# Patient Record
Sex: FEMALE | Race: WHITE | NOT HISPANIC OR LATINO | Employment: FULL TIME | URBAN - METROPOLITAN AREA
[De-identification: names, ages, dates, MRNs, and addresses within clinical notes are randomized per-mention and may not be internally consistent; named-entity substitution may affect disease eponyms.]

---

## 2023-10-20 PROBLEM — F90.0 ATTENTION DEFICIT HYPERACTIVITY DISORDER (ADHD), PREDOMINANTLY INATTENTIVE TYPE: Status: ACTIVE | Noted: 2023-10-20

## 2023-10-20 PROBLEM — F41.9 ANXIETY: Status: ACTIVE | Noted: 2023-10-20

## 2023-10-20 RX ORDER — BETAMETHASONE DIPROPIONATE 0.5 MG/G
CREAM TOPICAL
COMMUNITY
Start: 2022-03-03

## 2023-10-20 RX ORDER — NORGESTIMATE AND ETHINYL ESTRADIOL 0.25-0.035
KIT ORAL
COMMUNITY
Start: 2022-08-04

## 2023-10-20 RX ORDER — DEXTROAMPHETAMINE SACCHARATE, AMPHETAMINE ASPARTATE MONOHYDRATE, DEXTROAMPHETAMINE SULFATE AND AMPHETAMINE SULFATE 2.5; 2.5; 2.5; 2.5 MG/1; MG/1; MG/1; MG/1
1 CAPSULE, EXTENDED RELEASE ORAL DAILY
COMMUNITY
Start: 2022-08-24

## 2023-10-20 RX ORDER — DESOGESTREL AND ETHINYL ESTRADIOL 0.15-0.03
1 KIT ORAL DAILY
COMMUNITY
Start: 2023-06-14 | End: 2024-02-02

## 2023-10-24 ENCOUNTER — OFFICE VISIT (OUTPATIENT)
Dept: DERMATOLOGY | Facility: CLINIC | Age: 23
End: 2023-10-24
Payer: COMMERCIAL

## 2023-10-24 DIAGNOSIS — D22.9 NEVUS: Primary | ICD-10-CM

## 2023-10-24 DIAGNOSIS — L91.0 HYPERTROPHIC SCAR: ICD-10-CM

## 2023-10-24 PROCEDURE — 99213 OFFICE O/P EST LOW 20 MIN: CPT | Performed by: NURSE PRACTITIONER

## 2023-10-24 NOTE — PROGRESS NOTES
Subjective     Simran Michelle is a 23 y.o. female who presents for the following: Skin Check (Denies personal h/o Skin Cancer, but Grandmother and Father have had skin cancer. There are a few moles on scalp and on chest that she is concerned with today.).     Review of Systems:  No other skin or systemic complaints other than what is documented elsewhere in the note.    The following portions of the chart were reviewed this encounter and updated as appropriate:          Skin Cancer History  No skin cancer on file.      Specialty Problems    None       Objective   Well appearing patient in no apparent distress; mood and affect are within normal limits.    A focused skin examination was performed. All findings within normal limits unless otherwise noted below.    Assessment/Plan   1. Nevus (2)  Mid Parietal Scalp (2)  Flesh-colored, verrucous papules    No evidence of recurrence in scar and benign ROS.   Continue with regular total body skin exams.  ABCDEs of melanoma and atypical moles were discussed with the patient.  Patient was instructed to perform monthly self skin examination.  We recommended that the patient have regular full skin exams given an increased risk of subsequent skin cancers.  The patient was instructed to use sun protective behaviors including use of broad spectrum sunscreens and sun protective clothing to reduce risk of skin cancers.     2. Hypertrophic scar  Chest - Medial (Center)  Pink, smooth papule    Scars and keloids  - While healing, your skin may develop scar tissue at the site of surgery, disease, burn, injury, or other trauma.   - The healing of injury sites normally produces a scar. Some individuals are more inclined to develop raised, reddish scars, or oversized, irregular, jagged scars.  - Keloids are more likely to occur in individuals with darker skin types. A keloid is an enlarged, smooth area of scar tissue that is larger than the original injury.  Wound care to reduce scarring  includes:  - After an injury, clean the wound, then apply ointment and a bandage  - Keep cuts and scrapes clean daily  - Change bandages daily  - Return to the doctor on time for removal of any stitches  - Use sunscreen on scars  - No treatment necessary. Please call the office if the lesion changes or becomes painful.

## 2023-10-24 NOTE — PROGRESS NOTES
Subjective     Simran Michelle is a 23 y.o. female who presents for the following: Skin Check (Denies personal h/o Skin Cancer, but Grandmother and Father have had skin cancer. There are a few moles on scalp and on chest that she is concerned with today.).     Review of Systems:  No other skin or systemic complaints other than what is documented elsewhere in the note.    The following portions of the chart were reviewed this encounter and updated as appropriate:          Skin Cancer History  No skin cancer on file.      Specialty Problems    None       Objective   Well appearing patient in no apparent distress; mood and affect are within normal limits.    A focused skin examination was performed. All findings within normal limits unless otherwise noted below.    Assessment/Plan

## 2023-12-29 ENCOUNTER — TELEMEDICINE (OUTPATIENT)
Dept: PRIMARY CARE | Facility: CLINIC | Age: 23
End: 2023-12-29
Payer: COMMERCIAL

## 2023-12-29 DIAGNOSIS — H93.8X9 EAR CONGESTION, UNSPECIFIED LATERALITY: ICD-10-CM

## 2023-12-29 DIAGNOSIS — R09.81 SINUS CONGESTION: ICD-10-CM

## 2023-12-29 DIAGNOSIS — U07.1 COVID-19: Primary | ICD-10-CM

## 2023-12-29 PROCEDURE — 99214 OFFICE O/P EST MOD 30 MIN: CPT | Performed by: STUDENT IN AN ORGANIZED HEALTH CARE EDUCATION/TRAINING PROGRAM

## 2023-12-29 RX ORDER — FLUTICASONE PROPIONATE 50 MCG
1 SPRAY, SUSPENSION (ML) NASAL 2 TIMES DAILY
Qty: 16 G | Refills: 1 | Status: SHIPPED | OUTPATIENT
Start: 2023-12-29

## 2023-12-29 RX ORDER — AZELASTINE 1 MG/ML
1 SPRAY, METERED NASAL 2 TIMES DAILY
Qty: 30 ML | Refills: 2 | Status: SHIPPED | OUTPATIENT
Start: 2023-12-29

## 2023-12-29 ASSESSMENT — ENCOUNTER SYMPTOMS
SHORTNESS OF BREATH: 0
COUGH: 1
SWEATS: 0
HEADACHES: 0
RHINORRHEA: 1
FEVER: 0
MYALGIAS: 1
WHEEZING: 0
WEIGHT LOSS: 0
HEMOPTYSIS: 0
SORE THROAT: 1
CHILLS: 0
HEARTBURN: 0

## 2023-12-29 NOTE — PROGRESS NOTES
Subjective   Patient ID: Simran Michelle is a 23 y.o. female who presents for Covid-19 Home Monitoring Video Visit.  Today she is on a telemedicine visit.    Cough  This is a new problem. The current episode started in the past 7 days. The problem has been gradually worsening. The problem occurs every few minutes. The cough is Non-productive. Associated symptoms include ear congestion, ear pain, myalgias, nasal congestion, postnasal drip, rhinorrhea and a sore throat. Pertinent negatives include no chest pain, chills, fever, headaches, heartburn, hemoptysis, rash, shortness of breath, sweats, weight loss or wheezing. Nothing aggravates the symptoms. Risk factors for lung disease include animal exposure.     COVID  Patient calling into the office today via a telemedicine virtual visit to discuss signs/symptoms of an upper respiratory infection as well as her positivity to COVID-19    Stated that her  tested positive for COVID-19, 5 days ago and ultimately she started having symptoms on Tuesday of this week, 3 days ago    Unfortunately she tested positive today for COVID-19    Continues to notice some sinus congestion, sore throat, ear pain, and the overall feeling of being slightly ill    Denies any fevers, chills, body aches, or any difficulty breathing    Currently works at [employer] and needs a work note and questioning when she should go back and when she will not be contagious        Current Outpatient Medications on File Prior to Visit   Medication Sig Dispense Refill    amphetamine-dextroamphetamine XR (Adderall XR) 10 mg 24 hr capsule Take 1 capsule (10 mg) by mouth once daily.      betamethasone dipropionate 0.05 % cream 1 Application      desogestreL-ethinyl estradioL (Apri) 0.15-0.03 mg tablet Take 1 tablet by mouth once daily.      norgestimate-ethinyl estradioL (Sprintec, 28,) 0.25-35 mg-mcg tablet Sprintec 28 0.25-35 MG-MCG Oral Tablet   Refills: 0        Start : 4-Aug-2022   Active  28 Tablet Pack        No current facility-administered medications on file prior to visit.        No Known Allergies    Immunization History   Administered Date(s) Administered    DTaP vaccine, pediatric  (INFANRIX) 2002, 2002, 2002, 08/11/2003, 11/10/2007    Hepatitis B vaccine, pediatric/adolescent (RECOMBIVAX, ENGERIX) 2002, 2002, 2002    HiB PRP-T conjugate vaccine (HIBERIX, ACTHIB) 2002, 2002, 2002, 05/08/2003    MMR vaccine, subcutaneous (MMR II) 02/18/2003, 11/10/2007    Pneumococcal Conjugate PCV 7 2002, 2002, 2002, 08/11/2003    Poliovirus vaccine, subcutaneous (IPOL) 2002, 2002, 2002, 11/10/2007    Varicella vaccine, subcutaneous (VARIVAX) 02/18/2003, 01/05/2011         Review of Systems   Constitutional:  Negative for chills, fever and weight loss.   HENT:  Positive for ear pain, postnasal drip, rhinorrhea and sore throat.    Respiratory:  Positive for cough. Negative for hemoptysis, shortness of breath and wheezing.    Cardiovascular:  Negative for chest pain.   Gastrointestinal:  Negative for heartburn.   Musculoskeletal:  Positive for myalgias.   Skin:  Negative for rash.   Neurological:  Negative for headaches.     All pertinent positive symptoms are included in the history of present illness.  All other systems have been reviewed and are negative and noncontributory to this patient's current ailments.     Objective   There were no vitals taken for this visit.  BSA: There is no height or weight on file to calculate BSA.  No visits with results within 1 Month(s) from this visit.   Latest known visit with results is:   Legacy Encounter on 06/14/2023   Component Date Value Ref Range Status    Pathology Report 06/14/2023    Final                    Value:    Accession #: [id number]     Date of Procedure:  6/14/2023       Pathologist: [facility], Cytology  Date Reported: 6/21/2023  Date Received:   6/14/2023  Submitting Physician: Irais Pickering CNP  Attending Physician: Irais Pickering CNP                           FINAL CYTOLOGICAL INTERPRETATION        A.  THINPREP PAP CERVICAL:              Specimen adequacy:       SATISFACTORY FOR EVALUATION.       Quality Indicator: Endocervical/transformation zone component is present.              General Categorization:       NEGATIVE FOR INTRAEPITHELIAL LESION OR MALIGNANCY.           Slide(s) initially screened by a Cytotechnologist at [facility], [address] [place] [address]    This specimen has been analyzed by the mphoriap Imaging System ([employer]  an automated imaging and review system, which assists the laboratory in  evaluating cells on ThinPrep Pap tests. Following automated imaging, selected  fields from ever                          y slide were reviewed by a cytotechnologist and/or pathologist.    Electronically Signed Out By [facility], Cytology//[email or web address]   By the signature on this report, the individual or group listed as making the  Final Interpretation/Diagnosis certifies that they have reviewed this case.  Diagnostic interpretation performed at [facility] [address] [place] [address]  Educational Note:  Cervical cytology is a screening procedure primarily for squamous cancers and  precursors and has associated false-negative and false-positive results as  evidenced by published data.  Your patient’s test should be interpreted in this  context, together with patient’s history and clinical findings.  Regular  sampling and follow-up of unexplained clinical signs and symptoms are  recommended to minimize false negative results.         Clinical History  Date of Last Menstrual Period:     06/07/2023    Other Clinical Conditions:  Routine    Clinical Diagnosis History: Pap smear f                          or cervical cancer screening - (Z12.4)   Source of Specimen  A:  THINPREP PAP CERVICAL            [facility]  Department of Pathology   [address] [place] [address]           Physical Exam  CONSTITUTIONAL -appears slightly fatigued and only slightly ill  SKIN - normal skin color and pigmentation, normal skin turgor without rash, lesions, or nodules visualized  HEAD - no trauma, normocephalic  EYES - normal external exam  CHEST -no distressed breathing, good effort, sounds congested slightly during the examination  EXTREMITIES - no edema, no deformities  NEUROLOGICAL - normal balance, normal motor, no ataxia  PSYCHIATRIC - alert, pleasant and cordial, age-appropriate    Assessment/Plan   COVID  We had a long conversation about all of your signs/symptoms and I truly recommend that you continue monitoring and do supportive care    Alongside this and due to your ear pain and congestion, I will also help out with further supportive care including Astelin and fluticasone to be taken twice daily    In my medical opinion and per CDC guidelines, home isolation may be discontinued when you have met these criteria;  1. Fever has been gone for 24 hours without the use of any medications like ibuprofen and acetaminophen.  2. It has been at least 5-10 days since symptoms first appeared.  3. Other symptoms of COVID-19 are improving     I encourage all patients with any concerns for potential exposure or transmission to remain at home strictly and adhere to social distancing practices that have been widely recommended by the CDC.    I encouraged supportive care such as rest, fluids and Advil/Tylenol as warranted  Return to the clinic in 3-5 days or sooner if symptoms persist as we will then further evaluate    If emergency warning signs/symptoms occur such as trouble breathing, persistent pain or pressure in the chest, new confusion, inability to wake or stay awake, or bluish lips or face, you need to seek emergency medical care immediately.     I  will have my medical assistant reach out to you to provide a work note to go back next week

## 2024-01-09 ENCOUNTER — APPOINTMENT (OUTPATIENT)
Dept: PRIMARY CARE | Facility: CLINIC | Age: 24
End: 2024-01-09
Payer: COMMERCIAL

## 2024-02-01 DIAGNOSIS — Z30.9 ENCOUNTER FOR CONTRACEPTIVE MANAGEMENT, UNSPECIFIED TYPE: ICD-10-CM

## 2024-02-02 RX ORDER — DESOGESTREL AND ETHINYL ESTRADIOL 0.15-0.03
1 KIT ORAL DAILY
Qty: 28 TABLET | Refills: 5 | Status: SHIPPED | OUTPATIENT
Start: 2024-02-02

## 2024-02-02 NOTE — TELEPHONE ENCOUNTER
Reviewing  EMR  Last Annual Exam: 06/14/2023 with Jess Pickering CNP  Negative Pap 2023  Medication pended for Nataly Skip CNP   Due in June for Annual - no future appt is scheduled - Swapbox message sent to patient

## 2024-07-05 DIAGNOSIS — Z30.9 ENCOUNTER FOR CONTRACEPTIVE MANAGEMENT, UNSPECIFIED TYPE: ICD-10-CM

## 2024-07-05 RX ORDER — DESOGESTREL AND ETHINYL ESTRADIOL 0.15-0.03
1 KIT ORAL DAILY
Qty: 28 TABLET | Refills: 0 | Status: SHIPPED | OUTPATIENT
Start: 2024-07-05

## 2024-08-17 DIAGNOSIS — Z30.9 ENCOUNTER FOR CONTRACEPTIVE MANAGEMENT, UNSPECIFIED TYPE: ICD-10-CM

## 2024-08-19 DIAGNOSIS — Z78.9 USES BIRTH CONTROL: ICD-10-CM

## 2024-08-19 RX ORDER — NORGESTIMATE AND ETHINYL ESTRADIOL 0.25-0.035
1 KIT ORAL DAILY
Qty: 28 TABLET | Refills: 12 | Status: SHIPPED | OUTPATIENT
Start: 2024-08-19

## 2024-08-19 RX ORDER — DESOGESTREL AND ETHINYL ESTRADIOL 0.15-0.03
1 KIT ORAL DAILY
Qty: 28 TABLET | Refills: 5 | Status: SHIPPED | OUTPATIENT
Start: 2024-08-19

## 2024-08-19 NOTE — TELEPHONE ENCOUNTER
Reviewing  EMR  Last Annual Exam:  06/14/2023 with Jess Pickering CNP  Negative Pap 2023  Annual scheduled 11/05/2024  6 month supply of medication pended for Nataly Valdivia CNP

## 2024-10-22 ENCOUNTER — APPOINTMENT (OUTPATIENT)
Dept: PRIMARY CARE | Facility: CLINIC | Age: 24
End: 2024-10-22

## 2024-11-05 ENCOUNTER — APPOINTMENT (OUTPATIENT)
Dept: OBSTETRICS AND GYNECOLOGY | Facility: CLINIC | Age: 24
End: 2024-11-05
Payer: COMMERCIAL

## 2024-11-05 VITALS — DIASTOLIC BLOOD PRESSURE: 70 MMHG | BODY MASS INDEX: 28.1 KG/M2 | WEIGHT: 178 LBS | SYSTOLIC BLOOD PRESSURE: 120 MMHG

## 2024-11-05 DIAGNOSIS — Z30.9 ENCOUNTER FOR CONTRACEPTIVE MANAGEMENT, UNSPECIFIED TYPE: ICD-10-CM

## 2024-11-05 DIAGNOSIS — Z01.419 ENCOUNTER FOR WELL WOMAN EXAM WITH ROUTINE GYNECOLOGICAL EXAM: Primary | ICD-10-CM

## 2024-11-05 PROCEDURE — 99395 PREV VISIT EST AGE 18-39: CPT | Performed by: NURSE PRACTITIONER

## 2024-11-05 PROCEDURE — 1036F TOBACCO NON-USER: CPT | Performed by: NURSE PRACTITIONER

## 2024-11-05 RX ORDER — DESOGESTREL AND ETHINYL ESTRADIOL 0.15-0.03
1 KIT ORAL DAILY
Qty: 84 TABLET | Refills: 3 | Status: SHIPPED | OUTPATIENT
Start: 2024-11-05

## 2024-11-05 NOTE — PROGRESS NOTES
HPI:   Simran Michelle is a 24 y.o. who presents today for her annual gynecologic exam without complaints    She has the following concerns; None. Doing well. PAP is up to date. Needs refills of her ocp.     GYN HISTORY:  Periods are regular every 28-30 days, lasting 5 days.   Dysmenorrhea:none. Cyclic symptoms include none.   No intermenstrual bleeding, spotting, or discharge.    Current contraception: OCP (estrogen/progesterone)      Requests STD testing: no     PAP History   Last pap:   2023 Normal HPV Not done  History of abnormal pap: no  HPV vaccine: unsure   @paphx@    Health Screening  Family history of breast, uterine, ovarian or colon cancer: no         The patient feels safe at home.         Review of Systems:   Constitutional: no fever and no chills.  Cardiovascular: no chest pain.   Respiratory: no shortness of breath.   Gastrointestinal: no nausea, no abdominal pain and no constipation  Genitourinary: no dysuria, no urinary incontinence, no vaginal dryness, no pelvic pain and no vaginal discharge.   Neurological: no headache.  Psychiatric: no anxiety and no depression.              Objective         /70   Wt 80.7 kg (178 lb)   LMP 10/15/2024 (Approximate)   BMI 28.10 kg/m²         Physical Exam:   Constitutional: Alert and in no acute distress. Well developed, well nourished.      Neck: No neck asymmetry. Supple. Thyroid not enlarged and there were no palpable thyroid nodules.      Cardiovascular: Heart rate and rhythm were normal, normal S1 and S2, no gallops, and no murmurs.      Pulmonary: No respiratory distress. Clear bilateral breath sounds.      Chest: Breasts: Normal appearance, no nipple discharge and no skin changes. Palpation of breasts and axillae: No palpable mass and no axillary lymphadenopathy.      Abdomen: Soft nontender; no abdominal mass palpated. Normal bowel sounds. No organomegaly.      Genitourinary:   - External genitalia: Normal.   - Palpation of lymph nodes in  groin: No inguinal lymphadenopathy.   - Bartholin's Urethral and Skenes Glands: Normal.   - Urethra: Normal.    -Bladder: Normal on palpation.   - Vagina: Normal.   - Cervix: Normal.   - Uterus: Normal. Right Adnexa/parametria: Normal. Left Adnexa/parametria: Normal.   - Perianal Area: Normal.      Skin: Normal skin color and pigmentation, normal skin turgor, and no rash     Psychiatric: Alert and oriented x 3. Affect normal to patient baseline. Mood: Appropriate.            Assessment/Plan       Diagnoses and all orders for this visit:  Encounter for well woman exam with routine gynecological exam  Here for well woman exam. She is doing well. PAP is up to date. Periods are well managed with Apri. She needs refills.   Encounter for contraceptive management, unspecified type  -     desogestreL-ethinyl estradioL (Enskyce) 0.15-0.03 mg tablet; Take 1 tablet by mouth once daily.  Follow-up annually; sooner if needed.       MARISA Pimentel-CNP

## 2025-11-18 ENCOUNTER — APPOINTMENT (OUTPATIENT)
Dept: OBSTETRICS AND GYNECOLOGY | Facility: CLINIC | Age: 25
End: 2025-11-18
Payer: COMMERCIAL